# Patient Record
Sex: FEMALE | Race: WHITE | NOT HISPANIC OR LATINO | Employment: PART TIME | URBAN - METROPOLITAN AREA
[De-identification: names, ages, dates, MRNs, and addresses within clinical notes are randomized per-mention and may not be internally consistent; named-entity substitution may affect disease eponyms.]

---

## 2017-02-18 ENCOUNTER — APPOINTMENT (OUTPATIENT)
Dept: RADIOLOGY | Facility: MEDICAL CENTER | Age: 52
DRG: 069 | End: 2017-02-18
Attending: GENERAL ACUTE CARE HOSPITAL

## 2017-02-18 ENCOUNTER — HOSPITAL ENCOUNTER (INPATIENT)
Facility: MEDICAL CENTER | Age: 52
LOS: 2 days | DRG: 069 | End: 2017-02-20
Attending: GENERAL ACUTE CARE HOSPITAL | Admitting: INTERNAL MEDICINE

## 2017-02-18 ENCOUNTER — RESOLUTE PROFESSIONAL BILLING HOSPITAL PROF FEE (OUTPATIENT)
Dept: HOSPITALIST | Facility: MEDICAL CENTER | Age: 52
End: 2017-02-18

## 2017-02-18 DIAGNOSIS — R20.0 FACIAL NUMBNESS: ICD-10-CM

## 2017-02-18 DIAGNOSIS — R07.9 CHEST PAIN, UNSPECIFIED TYPE: ICD-10-CM

## 2017-02-18 DIAGNOSIS — R29.810 FACIAL DROOP: ICD-10-CM

## 2017-02-18 DIAGNOSIS — R20.0 RIGHT ARM NUMBNESS: ICD-10-CM

## 2017-02-18 DIAGNOSIS — R29.898 RIGHT ARM WEAKNESS: ICD-10-CM

## 2017-02-18 PROBLEM — R47.81 SLURRED SPEECH: Status: ACTIVE | Noted: 2017-02-18

## 2017-02-18 LAB
ALBUMIN SERPL BCP-MCNC: 4.1 G/DL (ref 3.2–4.9)
ALBUMIN/GLOB SERPL: 1.4 G/DL
ALP SERPL-CCNC: 95 U/L (ref 30–99)
ALT SERPL-CCNC: 11 U/L (ref 2–50)
ANION GAP SERPL CALC-SCNC: 9 MMOL/L (ref 0–11.9)
AST SERPL-CCNC: 16 U/L (ref 12–45)
BASOPHILS # BLD AUTO: 0.7 % (ref 0–1.8)
BASOPHILS # BLD: 0.06 K/UL (ref 0–0.12)
BILIRUB SERPL-MCNC: 0.3 MG/DL (ref 0.1–1.5)
BNP SERPL-MCNC: 3 PG/ML (ref 0–100)
BUN SERPL-MCNC: 14 MG/DL (ref 8–22)
CALCIUM SERPL-MCNC: 9.6 MG/DL (ref 8.5–10.5)
CHLORIDE SERPL-SCNC: 108 MMOL/L (ref 96–112)
CO2 SERPL-SCNC: 20 MMOL/L (ref 20–33)
CREAT SERPL-MCNC: 1.04 MG/DL (ref 0.5–1.4)
EKG IMPRESSION: NORMAL
EOSINOPHIL # BLD AUTO: 0.2 K/UL (ref 0–0.51)
EOSINOPHIL NFR BLD: 2.4 % (ref 0–6.9)
ERYTHROCYTE [DISTWIDTH] IN BLOOD BY AUTOMATED COUNT: 46.6 FL (ref 35.9–50)
GFR SERPL CREATININE-BSD FRML MDRD: 55 ML/MIN/1.73 M 2
GLOBULIN SER CALC-MCNC: 2.9 G/DL (ref 1.9–3.5)
GLUCOSE SERPL-MCNC: 93 MG/DL (ref 65–99)
HCT VFR BLD AUTO: 39.4 % (ref 37–47)
HGB BLD-MCNC: 13.5 G/DL (ref 12–16)
IMM GRANULOCYTES # BLD AUTO: 0.03 K/UL (ref 0–0.11)
IMM GRANULOCYTES NFR BLD AUTO: 0.4 % (ref 0–0.9)
LIPASE SERPL-CCNC: 35 U/L (ref 11–82)
LYMPHOCYTES # BLD AUTO: 2.25 K/UL (ref 1–4.8)
LYMPHOCYTES NFR BLD: 26.8 % (ref 22–41)
MCH RBC QN AUTO: 31.5 PG (ref 27–33)
MCHC RBC AUTO-ENTMCNC: 34.3 G/DL (ref 33.6–35)
MCV RBC AUTO: 91.8 FL (ref 81.4–97.8)
MONOCYTES # BLD AUTO: 0.78 K/UL (ref 0–0.85)
MONOCYTES NFR BLD AUTO: 9.3 % (ref 0–13.4)
NEUTROPHILS # BLD AUTO: 5.09 K/UL (ref 2–7.15)
NEUTROPHILS NFR BLD: 60.4 % (ref 44–72)
NRBC # BLD AUTO: 0 K/UL
NRBC BLD AUTO-RTO: 0 /100 WBC
PLATELET # BLD AUTO: 309 K/UL (ref 164–446)
PMV BLD AUTO: 9.8 FL (ref 9–12.9)
POTASSIUM SERPL-SCNC: 3.8 MMOL/L (ref 3.6–5.5)
PROT SERPL-MCNC: 7 G/DL (ref 6–8.2)
RBC # BLD AUTO: 4.29 M/UL (ref 4.2–5.4)
SODIUM SERPL-SCNC: 137 MMOL/L (ref 135–145)
TROPONIN I SERPL-MCNC: <0.01 NG/ML (ref 0–0.04)
WBC # BLD AUTO: 8.4 K/UL (ref 4.8–10.8)

## 2017-02-18 PROCEDURE — 85610 PROTHROMBIN TIME: CPT

## 2017-02-18 PROCEDURE — 99285 EMERGENCY DEPT VISIT HI MDM: CPT

## 2017-02-18 PROCEDURE — 99407 BEHAV CHNG SMOKING > 10 MIN: CPT | Performed by: INTERNAL MEDICINE

## 2017-02-18 PROCEDURE — 700117 HCHG RX CONTRAST REV CODE 255: Performed by: GENERAL ACUTE CARE HOSPITAL

## 2017-02-18 PROCEDURE — 36415 COLL VENOUS BLD VENIPUNCTURE: CPT

## 2017-02-18 PROCEDURE — 80053 COMPREHEN METABOLIC PANEL: CPT

## 2017-02-18 PROCEDURE — 85025 COMPLETE CBC W/AUTO DIFF WBC: CPT

## 2017-02-18 PROCEDURE — 85730 THROMBOPLASTIN TIME PARTIAL: CPT

## 2017-02-18 PROCEDURE — 93005 ELECTROCARDIOGRAM TRACING: CPT

## 2017-02-18 PROCEDURE — 84484 ASSAY OF TROPONIN QUANT: CPT

## 2017-02-18 PROCEDURE — 70450 CT HEAD/BRAIN W/O DYE: CPT

## 2017-02-18 PROCEDURE — 99223 1ST HOSP IP/OBS HIGH 75: CPT | Mod: 25 | Performed by: INTERNAL MEDICINE

## 2017-02-18 PROCEDURE — 700105 HCHG RX REV CODE 258: Performed by: GENERAL ACUTE CARE HOSPITAL

## 2017-02-18 PROCEDURE — 83880 ASSAY OF NATRIURETIC PEPTIDE: CPT

## 2017-02-18 PROCEDURE — 83690 ASSAY OF LIPASE: CPT

## 2017-02-18 PROCEDURE — 770020 HCHG ROOM/CARE - TELE (206)

## 2017-02-18 PROCEDURE — 70498 CT ANGIOGRAPHY NECK: CPT

## 2017-02-18 PROCEDURE — 74175 CTA ABDOMEN W/CONTRAST: CPT

## 2017-02-18 PROCEDURE — 70496 CT ANGIOGRAPHY HEAD: CPT

## 2017-02-18 RX ORDER — SODIUM CHLORIDE 9 MG/ML
1000 INJECTION, SOLUTION INTRAVENOUS ONCE
Status: COMPLETED | OUTPATIENT
Start: 2017-02-18 | End: 2017-02-18

## 2017-02-18 RX ADMIN — SODIUM CHLORIDE 1000 ML: 9 INJECTION, SOLUTION INTRAVENOUS at 22:21

## 2017-02-18 RX ADMIN — IOHEXOL 150 ML: 350 INJECTION, SOLUTION INTRAVENOUS at 23:47

## 2017-02-18 ASSESSMENT — PAIN SCALES - GENERAL: PAINLEVEL_OUTOF10: 5

## 2017-02-19 ENCOUNTER — APPOINTMENT (OUTPATIENT)
Dept: RADIOLOGY | Facility: MEDICAL CENTER | Age: 52
DRG: 069 | End: 2017-02-19
Attending: INTERNAL MEDICINE

## 2017-02-19 LAB
ANION GAP SERPL CALC-SCNC: 7 MMOL/L (ref 0–11.9)
APTT PPP: 32.6 SEC (ref 24.7–36)
APTT PPP: 32.8 SEC (ref 24.7–36)
BUN SERPL-MCNC: 12 MG/DL (ref 8–22)
CALCIUM SERPL-MCNC: 8.8 MG/DL (ref 8.5–10.5)
CHLORIDE SERPL-SCNC: 110 MMOL/L (ref 96–112)
CHOLEST SERPL-MCNC: 190 MG/DL (ref 100–199)
CO2 SERPL-SCNC: 22 MMOL/L (ref 20–33)
CREAT SERPL-MCNC: 0.84 MG/DL (ref 0.5–1.4)
EKG IMPRESSION: NORMAL
ERYTHROCYTE [DISTWIDTH] IN BLOOD BY AUTOMATED COUNT: 48.1 FL (ref 35.9–50)
EST. AVERAGE GLUCOSE BLD GHB EST-MCNC: 120 MG/DL
GFR SERPL CREATININE-BSD FRML MDRD: >60 ML/MIN/1.73 M 2
GLUCOSE SERPL-MCNC: 90 MG/DL (ref 65–99)
HBA1C MFR BLD: 5.8 % (ref 0–5.6)
HCT VFR BLD AUTO: 37 % (ref 37–47)
HDLC SERPL-MCNC: 30 MG/DL
HGB BLD-MCNC: 12.8 G/DL (ref 12–16)
INR PPP: 0.97 (ref 0.87–1.13)
INR PPP: 1.06 (ref 0.87–1.13)
LDLC SERPL CALC-MCNC: 133 MG/DL
MCH RBC QN AUTO: 31.9 PG (ref 27–33)
MCHC RBC AUTO-ENTMCNC: 34.6 G/DL (ref 33.6–35)
MCV RBC AUTO: 92.3 FL (ref 81.4–97.8)
PLATELET # BLD AUTO: 269 K/UL (ref 164–446)
PMV BLD AUTO: 9.5 FL (ref 9–12.9)
POTASSIUM SERPL-SCNC: 4 MMOL/L (ref 3.6–5.5)
PROTHROMBIN TIME: 13.2 SEC (ref 12–14.6)
PROTHROMBIN TIME: 14.1 SEC (ref 12–14.6)
RBC # BLD AUTO: 4.01 M/UL (ref 4.2–5.4)
SODIUM SERPL-SCNC: 139 MMOL/L (ref 135–145)
TRIGL SERPL-MCNC: 134 MG/DL (ref 0–149)
TROPONIN I SERPL-MCNC: <0.01 NG/ML (ref 0–0.04)
TROPONIN I SERPL-MCNC: <0.01 NG/ML (ref 0–0.04)
WBC # BLD AUTO: 6.4 K/UL (ref 4.8–10.8)

## 2017-02-19 PROCEDURE — 84484 ASSAY OF TROPONIN QUANT: CPT | Mod: 91

## 2017-02-19 PROCEDURE — 36415 COLL VENOUS BLD VENIPUNCTURE: CPT

## 2017-02-19 PROCEDURE — 93005 ELECTROCARDIOGRAM TRACING: CPT | Performed by: INTERNAL MEDICINE

## 2017-02-19 PROCEDURE — A9270 NON-COVERED ITEM OR SERVICE: HCPCS | Performed by: INTERNAL MEDICINE

## 2017-02-19 PROCEDURE — 80048 BASIC METABOLIC PNL TOTAL CA: CPT

## 2017-02-19 PROCEDURE — 770020 HCHG ROOM/CARE - TELE (206)

## 2017-02-19 PROCEDURE — 700111 HCHG RX REV CODE 636 W/ 250 OVERRIDE (IP): Performed by: INTERNAL MEDICINE

## 2017-02-19 PROCEDURE — 99232 SBSQ HOSP IP/OBS MODERATE 35: CPT | Performed by: INTERNAL MEDICINE

## 2017-02-19 PROCEDURE — 83036 HEMOGLOBIN GLYCOSYLATED A1C: CPT

## 2017-02-19 PROCEDURE — 700102 HCHG RX REV CODE 250 W/ 637 OVERRIDE(OP): Performed by: INTERNAL MEDICINE

## 2017-02-19 PROCEDURE — 80061 LIPID PANEL: CPT

## 2017-02-19 PROCEDURE — A9577 INJ MULTIHANCE: HCPCS | Performed by: INTERNAL MEDICINE

## 2017-02-19 PROCEDURE — 85610 PROTHROMBIN TIME: CPT

## 2017-02-19 PROCEDURE — 700117 HCHG RX CONTRAST REV CODE 255: Performed by: INTERNAL MEDICINE

## 2017-02-19 PROCEDURE — 85027 COMPLETE CBC AUTOMATED: CPT

## 2017-02-19 PROCEDURE — 70553 MRI BRAIN STEM W/O & W/DYE: CPT

## 2017-02-19 PROCEDURE — 93010 ELECTROCARDIOGRAM REPORT: CPT | Performed by: INTERNAL MEDICINE

## 2017-02-19 PROCEDURE — 85730 THROMBOPLASTIN TIME PARTIAL: CPT

## 2017-02-19 RX ORDER — CLOPIDOGREL BISULFATE 75 MG/1
75 TABLET ORAL DAILY
Status: DISCONTINUED | OUTPATIENT
Start: 2017-02-19 | End: 2017-02-20 | Stop reason: HOSPADM

## 2017-02-19 RX ORDER — ACETAMINOPHEN 325 MG/1
650 TABLET ORAL EVERY 6 HOURS PRN
Status: DISCONTINUED | OUTPATIENT
Start: 2017-02-19 | End: 2017-02-20 | Stop reason: HOSPADM

## 2017-02-19 RX ORDER — NICOTINE 21 MG/24HR
21 PATCH, TRANSDERMAL 24 HOURS TRANSDERMAL
Status: DISCONTINUED | OUTPATIENT
Start: 2017-02-19 | End: 2017-02-20 | Stop reason: HOSPADM

## 2017-02-19 RX ORDER — ASPIRIN 81 MG/1
81 TABLET, CHEWABLE ORAL DAILY
Status: DISCONTINUED | OUTPATIENT
Start: 2017-02-19 | End: 2017-02-20 | Stop reason: HOSPADM

## 2017-02-19 RX ORDER — ENEMA 19; 7 G/133ML; G/133ML
1 ENEMA RECTAL
Status: DISCONTINUED | OUTPATIENT
Start: 2017-02-19 | End: 2017-02-20 | Stop reason: HOSPADM

## 2017-02-19 RX ORDER — ONDANSETRON 2 MG/ML
4 INJECTION INTRAMUSCULAR; INTRAVENOUS EVERY 4 HOURS PRN
Status: DISCONTINUED | OUTPATIENT
Start: 2017-02-19 | End: 2017-02-20 | Stop reason: HOSPADM

## 2017-02-19 RX ORDER — LACTULOSE 20 G/30ML
30 SOLUTION ORAL
Status: DISCONTINUED | OUTPATIENT
Start: 2017-02-19 | End: 2017-02-20 | Stop reason: HOSPADM

## 2017-02-19 RX ORDER — ATORVASTATIN CALCIUM 80 MG/1
80 TABLET, FILM COATED ORAL EVERY EVENING
Status: DISCONTINUED | OUTPATIENT
Start: 2017-02-19 | End: 2017-02-20 | Stop reason: HOSPADM

## 2017-02-19 RX ORDER — PROMETHAZINE HYDROCHLORIDE 25 MG/1
12.5-25 SUPPOSITORY RECTAL EVERY 4 HOURS PRN
Status: DISCONTINUED | OUTPATIENT
Start: 2017-02-19 | End: 2017-02-20 | Stop reason: HOSPADM

## 2017-02-19 RX ORDER — MORPHINE SULFATE 4 MG/ML
2-4 INJECTION, SOLUTION INTRAMUSCULAR; INTRAVENOUS
Status: DISCONTINUED | OUTPATIENT
Start: 2017-02-19 | End: 2017-02-20 | Stop reason: HOSPADM

## 2017-02-19 RX ORDER — ASPIRIN 325 MG
325 TABLET ORAL EVERY EVENING
COMMUNITY

## 2017-02-19 RX ORDER — AMOXICILLIN 250 MG
1 CAPSULE ORAL NIGHTLY
Status: DISCONTINUED | OUTPATIENT
Start: 2017-02-19 | End: 2017-02-20 | Stop reason: HOSPADM

## 2017-02-19 RX ORDER — PROMETHAZINE HYDROCHLORIDE 25 MG/1
12.5-25 TABLET ORAL EVERY 4 HOURS PRN
Status: DISCONTINUED | OUTPATIENT
Start: 2017-02-19 | End: 2017-02-20 | Stop reason: HOSPADM

## 2017-02-19 RX ORDER — DOCUSATE SODIUM 100 MG/1
100 CAPSULE, LIQUID FILLED ORAL 2 TIMES DAILY
Status: DISCONTINUED | OUTPATIENT
Start: 2017-02-19 | End: 2017-02-20 | Stop reason: HOSPADM

## 2017-02-19 RX ORDER — ONDANSETRON 4 MG/1
4 TABLET, ORALLY DISINTEGRATING ORAL EVERY 4 HOURS PRN
Status: DISCONTINUED | OUTPATIENT
Start: 2017-02-19 | End: 2017-02-20 | Stop reason: HOSPADM

## 2017-02-19 RX ORDER — AMOXICILLIN 250 MG
1 CAPSULE ORAL
Status: DISCONTINUED | OUTPATIENT
Start: 2017-02-19 | End: 2017-02-20 | Stop reason: HOSPADM

## 2017-02-19 RX ORDER — LISINOPRIL 20 MG/1
20 TABLET ORAL
Status: DISCONTINUED | OUTPATIENT
Start: 2017-02-19 | End: 2017-02-20 | Stop reason: HOSPADM

## 2017-02-19 RX ORDER — NITROGLYCERIN 0.4 MG/1
0.4 TABLET SUBLINGUAL
Status: DISCONTINUED | OUTPATIENT
Start: 2017-02-19 | End: 2017-02-20 | Stop reason: HOSPADM

## 2017-02-19 RX ORDER — BISACODYL 10 MG
10 SUPPOSITORY, RECTAL RECTAL
Status: DISCONTINUED | OUTPATIENT
Start: 2017-02-19 | End: 2017-02-20 | Stop reason: HOSPADM

## 2017-02-19 RX ADMIN — LISINOPRIL 20 MG: 20 TABLET ORAL at 17:46

## 2017-02-19 RX ADMIN — CLOPIDOGREL 75 MG: 75 TABLET, FILM COATED ORAL at 08:53

## 2017-02-19 RX ADMIN — ACETAMINOPHEN 650 MG: 325 TABLET, FILM COATED ORAL at 22:16

## 2017-02-19 RX ADMIN — GADOBENATE DIMEGLUMINE 9 ML: 529 INJECTION, SOLUTION INTRAVENOUS at 14:33

## 2017-02-19 RX ADMIN — ENOXAPARIN SODIUM 40 MG: 100 INJECTION SUBCUTANEOUS at 08:54

## 2017-02-19 RX ADMIN — ASPIRIN 81 MG: 81 TABLET, CHEWABLE ORAL at 08:53

## 2017-02-19 ASSESSMENT — PAIN SCALES - GENERAL
PAINLEVEL_OUTOF10: 0
PAINLEVEL_OUTOF10: 4

## 2017-02-19 ASSESSMENT — PATIENT HEALTH QUESTIONNAIRE - PHQ9
2. FEELING DOWN, DEPRESSED, IRRITABLE, OR HOPELESS: NOT AT ALL
SUM OF ALL RESPONSES TO PHQ9 QUESTIONS 1 AND 2: 0
1. LITTLE INTEREST OR PLEASURE IN DOING THINGS: NOT AT ALL
SUM OF ALL RESPONSES TO PHQ QUESTIONS 1-9: 0

## 2017-02-19 ASSESSMENT — LIFESTYLE VARIABLES
EVER_SMOKED: YES
ALCOHOL_USE: NO

## 2017-02-19 ASSESSMENT — COPD QUESTIONNAIRES
COPD SCREENING SCORE: 4
HAVE YOU SMOKED AT LEAST 100 CIGARETTES IN YOUR ENTIRE LIFE: YES
DURING THE PAST 4 WEEKS HOW MUCH DID YOU FEEL SHORT OF BREATH: SOME OF THE TIME
DO YOU EVER COUGH UP ANY MUCUS OR PHLEGM?: NO/ONLY WITH OCCASIONAL COLDS OR INFECTIONS

## 2017-02-19 NOTE — ED PROVIDER NOTES
ED Provider Note    Scribed for Tony Moncada M.D. by Madison Muñiz. 2/18/2017, 9:42 PM.    Primary care provider: MARIA M Prather  Means of arrival: Walk-In  History obtained from: Patient  History limited by: None    CHIEF COMPLAINT  Chief Complaint   Patient presents with   • Numbness   • Unilateral Weakness     no resolved - started at 1730 and resolved within 10 minutes   • Chest Pain       HPI  Mónicaobinna Aguilera is a 51 y.o. female who presents to the Emergency Department complaining of chest pain onset approximately 11 hours ago. Per patient it is a tight chest pain that has been progressively worsening. She explains that the pain radiates to her left side and left arm. Patient states she went to work this afternoon at 530pm and noticed her right arm went numb. She explains that for a few minutes she was unable to talk or walk. She also reports associated speech and vision changes lasting for 10-20 minutes. Patient denies headaches. Currently has no headache or vision changes or numbness or weakness in her upper or lower extremities or face but still has mild left-sided chest pain that radiates to her left arm.    REVIEW OF SYSTEMS  See HPI for further details. All other systems are negative.     PAST MEDICAL HISTORY   has a past medical history of Cancer (CMS-HCC) and Hypertension.    SURGICAL HISTORY   has past surgical history that includes other neurological surg (June 2013).    SOCIAL HISTORY  Social History   Substance Use Topics   • Smoking status: Current Every Day Smoker -- 0.50 packs/day     Types: Cigarettes   • Smokeless tobacco: Never Used      Comment: 1 ppd   • Alcohol Use: No      History   Drug Use No       FAMILY HISTORY  History reviewed. No pertinent family history.    CURRENT MEDICATIONS  Reviewed. See Encounter Summary.     ALLERGIES  Allergies   Allergen Reactions   • Augmentin    • Codeine    • Eggs Nausea   • Lactose Nausea   • Other Food Nausea     Mayonnaise    • Pcn  "[Penicillins]        PHYSICAL EXAM  VITAL SIGNS: /69 mmHg  Pulse 87  Temp(Src) 36.8 °C (98.2 °F)  Resp 16  Ht 1.727 m (5' 8\")  Wt 84.5 kg (186 lb 4.6 oz)  BMI 28.33 kg/m2  SpO2 97%  LMP 02/21/2011   Pulse ox interpretation: I interpret this pulse ox as normal.  Constitutional: Alert in mild painful distress.  HENT: No signs of trauma, Bilateral external ears normal, Nose normal. MMM mild facial asymmetry  Eyes: Pupils are equal and reactive, Conjunctiva normal, Non-icteric.   Neck: Normal range of motion, No tenderness, Supple, No stridor. No JVD  Lymphatic: No lymphadenopathy noted.   Cardiovascular: Regular rate and rhythm, no murmurs, 2+ equal pulses radial and DP.   Thorax & Lungs: Normal breath sounds, No respiratory distress, No wheezing, No chest tenderness.   Abdomen: Bowel sounds normal, Soft, No tenderness, No tenderness at McBurney's point, No masses, No pulsatile masses. No peritoneal signs.  Skin: Warm, Dry, No erythema, No rash.   Back: No bony tenderness, No CVA tenderness.   Extremities: Intact distal pulses, No edema, No tenderness, No cyanosis,  No warmth or asymmetry  Musculoskeletal: Good range of motion in all major joints. No tenderness to palpation or major deformities noted.   Neurologic: Alert and oriented ×4 , Face symmetric, mild loss of left-sided nasolabial fold, Sensation equal and intact V1-V3, No pronator drift, Finger to nose and rapid alternating movements intact.  strength, bicep and triceps 5/5 bilaterally. Thumbs up sign, Ok sign, and finger abduction intact bilaterally.  Knee flexion, extension, dorsiflexion, and plantarflexion 5/5 bilaterally  Psychiatric: Affect normal, Judgment normal, Mood normal. , Mild slurred speech            DIAGNOSTIC STUDIES / PROCEDURES     LABS  Labs Reviewed   ESTIMATED GFR - Abnormal; Notable for the following:     GFR If Non  55 (*)     All other components within normal limits    Narrative:     Indicate which " anticoagulants the patient is on:->UNKNOWN   CBC WITHOUT DIFFERENTIAL - Abnormal; Notable for the following:     RBC 4.01 (*)     All other components within normal limits   TROPONIN    Narrative:     Indicate which anticoagulants the patient is on:->UNKNOWN   BTYPE NATRIURETIC PEPTIDE    Narrative:     Indicate which anticoagulants the patient is on:->UNKNOWN   CBC WITH DIFFERENTIAL    Narrative:     Indicate which anticoagulants the patient is on:->UNKNOWN   COMP METABOLIC PANEL    Narrative:     Indicate which anticoagulants the patient is on:->UNKNOWN   LIPASE    Narrative:     Indicate which anticoagulants the patient is on:->UNKNOWN   APTT    Narrative:     Indicate which anticoagulants the patient is on:->UNKNOWN   BASIC METABOLIC PANEL   HEMOGLOBIN A1C   PROTHROMBIN TIME    Narrative:     Indicate which anticoagulants the patient is on:->UNKNOWN   LIPID PROFILE   TROPONIN     All labs were reviewed by me.    EKG  12 Lead EKG interpreted by me to show: 7:51PM  Sinus tachycardia with a rate of 102  Normal intervals  No ST elevation or depression  Nonspecific T wave abnormality      RADIOLOGY  CT-CTA COMPLETE THORACOABDOMINAL AORTA   Final Result      1.  No evidence of acute traumatic aortic injury   2.  Atherosclerosis with estimated 50-75% stenosis of the infrarenal abdominal aorta   3.  Estimated 25-50% stenosis of the LEFT renal artery   4.  RIGHT renal atrophy with diffuse arterial narrowing   5.  Estimated 75% or greater stenosis of the celiac artery, suspect median arcuate ligament compression   6.  Subcentimeter hypodense RIGHT hepatic lesion is likely a cyst or hemangioma absent a history of cancer   7.  Probable subcentimeter LEFT renal cyst   8.  Emphysema   9.  Mildly enlarged mediastinal lymph nodes         CT-CTA NECK WITH & W/O-POST PROCESSING   Final Result      1.  CT angiogram of the neck within normal limits.   2.  BILATERAL subcentimeter partially calcified thyroid nodules      CT-CTA HEAD  WITH & W/O-POST PROCESS   Final Result      CT angiogram of the [place] within normal limits.      CT-HEAD W/O   Final Result      1.  No evidence of intracranial hemorrhage or large territorial infarction   2.  New RIGHT frontal area of white matter hypodensity and coarse calcification could be an evolving area of encephalomalacia or could be a neoplasm with calcifications as a ganglioglioma or astrocytoma or a vascular lesion such as a cavernous    malformation with interval hemorrhage. Recommend MRI brain without and with contrast further assess when clinically appropriate.      MR-BRAIN-WITH    (Results Pending)   NM-CARDIAC STRESS TEST    (Results Pending)     The radiologist's interpretation of all radiological studies have been reviewed by me.    COURSE & MEDICAL DECISION MAKING  Nursing notes, VS, PMSFHx reviewed in chart. Pertinent Labs & Imaging studies reviewed. (See chart for details)    9:42 PM Patient seen and examined at bedside. Ordered for estimated GFR, troponin, BNP, CBC with differential, CBC with differential, CMP, prothrombin time, APTT, lipase, EKG to evaluate.     10:28pm Spoke w NEuro Dr. Bustamante who recommends continuing inpatient follow up but because her neuro symptoms of right arm numbness and weakness and left facial numbness and tongue symptoms are mild and improving and she is now out of the window that no TPA is indicated at this time.    Because of her chest pain that radiates to her left arm with stroke like symptoms she is awaiting CTangio of aorta and head and neck. Plan to admit to hospitalist, already received 162 of asa at work PTA, will await further AC until Angio results.    Decision Making:  This is a 51 y.o. year old female who presents with chest pain radiating to her left arm and stroke like symptoms with left face numbness and right arm numbness and weakness. Patient is out of the window for TPA and is pending CT angios and plan for admitting to medicine  service.    Patient is admitted to the hospitalist service in good condition, aspirin was given prior to arrival.    FINAL IMPRESSION  1. Chest pain, unspecified type    2. Facial numbness    3. Facial droop    4. Right arm numbness    5. Right arm weakness          Madison FRY (Scribe), am scribing for, and in the presence of, Tony Moncada M.D..    Electronically signed by: Madison Muñiz (Scribe), 2/18/2017    ITony M.D. personally performed the services described in this documentation, as scribed by Madison Muñiz in my presence, and it is both accurate and complete.    The note accurately reflects work and decisions made by me.  Tony Moncada  2/19/2017  3:32 AM

## 2017-02-19 NOTE — ED NOTES
Report received from Naomi ELIZALDE. Pt resting quietly on right side. Pt is oriented x4, drowsy. STEPHENSON. Denies any weakness at this time. Denies headache. VSS. Pt comfort level checked, denies needs. Awaiting admitting physician. Call light in reach.

## 2017-02-19 NOTE — ED NOTES
PIV placed as charted, pt medicated per MAR, informed of pending CT, verbalized understanding. No distress noted, denies needs at this time.

## 2017-02-19 NOTE — PROGRESS NOTES
Pt refusing telemetry monitoring.  Pulled box off.  md aware.  Pt is agreeable to go to mri after consulting further with md.   Will wait for now.   Appears emotionally labile.    at bedside appears intoxicated.

## 2017-02-19 NOTE — CARE PLAN
Problem: Communication  Goal: The ability to communicate needs accurately and effectively will improve  Outcome: PROGRESSING AS EXPECTED  Intervention: Educate patient and significant other/support system about the plan of care, procedures, treatments, medications and allow for questions  Patient has been educated and understands the plan of care, scheduled procedures, treatments, and medications up to this point in her care.      Problem: Safety  Goal: Will remain free from falls  Outcome: PROGRESSING AS EXPECTED  Intervention: Implement fall precautions  Fall precautions in place, patient educated and understands the need for fall precautions.  Treaded slipper socks on patient, bed alarm in use, call light within reach, no history of falls.

## 2017-02-19 NOTE — PROGRESS NOTES
Patient arrived from ED via gurney with transport.  Patient on room air, IV saline locked.  Report received from Naomi/TONIO.  No reports of pain, numbness, or tingling at this time.  Will continue to monitor with hourly rounding in place.  Went over POC with patient, safety precautions are in place.

## 2017-02-19 NOTE — H&P
PRIMARY CARE PHYSICIAN:  PHILLY Bone    CHIEF COMPLAINT:  Left-sided chest pain, right upper extremity weakness and   left facial numbness, slurred speech.    HISTORY OF PRESENT ILLNESS:  This is a 51-year-old female with a past medical   history significant for hypertension who presents today for evaluation of   left-sided chest pain that began around 11:00 a.m. this morning.  She states   the pain is intermittent and it is sharp pain that comes and goes, lasted   until 8:00 p.m. today.  The patient states that she took 2 aspirin, but does   not know the dosage of her aspirin at home before arriving to the ER.  She   denies any shortness of breath, fever, chills, diaphoresis, nausea, vomiting,   headache associated with it.  She denies any palpitations, lower extremity   edema associated with it.  She then states that at 5:30 p.m. this evening, she   noticed that she was having right arm weakness as well as left facial   numbness and slurred speech.  She now tells me that her slurred speech and   right upper extremity weakness have resolved.  She is presently chest pain   free.    REVIEW OF SYSTEMS:  A comprehensive review of systems was conducted and all   pertinent positive and negative are documented in the HPI.    PAST MEDICAL HISTORY:  Significant for  hypertension.    PAST SURGICAL HISTORY:  None.    SOCIAL HISTORY:  Significant for patient smokes half a pack a day for the past   40 years.  Denies any alcohol or illicit drug use.    FAMILY HISTORY:  Significant for mother who had brain cancer, father who had   liver cancer and grandmother who had colon cancer.    ALLERGIES:  PATIENT IS ALLERGIC TO AUGMENTIN, CODEINE, EGGS, LACTOSE,   PENICILLIN.    HOME MEDICATIONS:  Aspirin 325 mg daily, aspirin 81 mg daily, lisinopril 20 mg   daily.    PHYSICAL EXAMINATION:  VITAL SIGNS:  Temperature 36.7, heart rate is 76, respirations of 14, blood   pressure 125/66, O2 saturation 95% on room air.  GENERAL:  This  is a 51-year-old pleasant, cooperative female in no acute   distress.  HEENT:  Normocephalic, atraumatic.  Pupils equal and reactive to light.    Extraocular motions intact.  Mild left facial droop noted.  NECK:  Supple.  No lymphadenopathy noted.  CARDIOVASCULAR:  Regular rate and rhythm.  No murmurs, rubs or gallops noted.  LUNGS:  Clear to auscultation bilaterally.  No rhonchi, wheezes, or rales   heard.  ABDOMEN:  Soft, nontender, and nondistended.  Bowel sounds present.  EXTREMITIES:  No clubbing, cyanosis or edema noted.  NEUROLOGIC:  Alert, awake, oriented x4.  Mild left facial droop noted.    Sensation intact.  No pronator drift noted.  Finger-to-nose is within normal   limits, 5/5 motor strength in bilateral upper and lower extremities.  PSYCHIATRIC:  Normal mood, normal affect, normal judgment.    LABORATORY DATA:  WBC of 8.4, hemoglobin of 13.5, hematocrit 39.4.  Lipase of   35, platelets of 309.  Sodium 137.  Troponin less than 0.01.  BNP of 3.    IMAGING STUDIES:  CTA thoracoabdominal shows no evidence of acute trauma.    Arthrosclerosis with estimated 50-75% stenosis of the infrarenal abdominal   aorta, 25-50% stenosis of the left renal artery, right renal atrophy with   diffuse arterial narrowing noted, 75% or greater stenosis of cephalic artery,   suspect median arcuate ligament compression.  CTA neck within normal limits,   bilateral subcentimeter partially calcified thyroid nodules noted.  CTA head   within normal limits.  CT head shows new right frontal area of white matter   that high that is hypodense and coarse calcification noted, this could be   evolving encephalomalacia versus neoplasm.  EKG per my read, sinus   tachycardia, rate of 102, QTC of 449, no acute ST or T-wave abnormalities   noted.    ASSESSMENT AND PLAN:  1.  For patient's chest pain, she will be admitted and monitored closely on   telemetry.  We will continue to trend patient's troponins and EKG.  Patient is   on aspirin 81 mg  daily, Lipitor 80 mg daily.  Lipid panel is ordered.    Patient's echo in January 2016 shows EF of 40%, global hypokinesis, grade II   diastolic dysfunction.  Repeat echo is ordered.  The patient's nuclear stress   test in January 2016 showed prior anterior apical and inferior infarct, no   reversible ischemia noted.  A repeat nuclear stress test ordered for further   evaluation.  2.  For patient's new right frontal area lesion, MRI brain is ordered for   further evaluation.  3.  For the patient's right upper extremity weakness and left-sided facial   droop, patient will be admitted, q. 4 hour neuro checks are ordered.  As   patient was previously on aspirin 325 mg, was started on Plavix 75 mg daily.    Repeat echo was ordered.  CTA head and neck did not show any acute findings.  Case was discussed by ERP with neurology and patient was not a tpa candidate.  4. 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  5.  Tobacco abuse.  Smoking cessation counseling was provided for 10 minutes.    Patient counseled on outpatient resources as well as her medication options   including Wellbutrin, Chantix.  Patient states that she is willing to quit.    She started on nicotine replacement therapy.  6.  Hypertension.  I will hold the patient's lisinopril at present time and   allow for permissive hypertension.  7.  Systolic and diastolic combined heart failure.  The patient is not in   acute exacerbation.  I am currently allowing permissive hypertension due to patient's   TIA.  The patient will need to be started on lisinopril and beta-blockers   prior to discharge.  The patient will need heart failure education prior to   discharge.     DISPOSITION:  Inpatient, patient will likely require greater than 2 midnights   of care.    PROPHYLAXIS:  Patient started on Lovenox subQ for DVT prophylaxis.  No GI   prophylaxis indicated.  Bowel protocol initiated.      CODE STATUS:  Full.       ____________________________________     FRANSICO WILHELM MD MS / [facility]BIN[place]    DD:  02/19/2017 05:21:57  DT:  02/19/2017 06:03:16    D#:  [id number]  Job#:  [id number]

## 2017-02-19 NOTE — PROGRESS NOTES
Report received, pt care assumed, tele box on. VSS, pt assessment complete. Pt aaox4, no signs of distress noted at this time. POC discussed with pt and verbalizes no questions. Pt c/o of 0 pain in 0, PRN pain med 0 administered. Pt denies any additional needs at this time. Bed in lowest position, bed alarm off, pt educated on fall risk and verbalized understanding, call light within reach, will continue to monitor.     Pt refusing stress test at this time.  nuc med, cardiology np, and hospitalist aware.   Stroke scale completed, 0

## 2017-02-19 NOTE — CONSULTS
"Cardiology Consult Note:    Evelyne Verdugo  Date & Time note created:    2/19/2017   11:43 AM     Referring MD:  Dr. Zavala    Patient ID:   Name:             Mónica Aguilera     YOB: 1965  Age:                 51 y.o.  female   MRN:               [id number]                                                             Chief Complaint:      Chest pressure yesterday    History of Present Illness:    52 y/o female moving back from [place] was working hard unpacking;C/o Rt facial weakness and Left facial strange feelings Yesterday at 5 pm long with moderate left chest tightness; Currently brief Chest tightness was gone and she attributed to \"over did it\" Last year she had similar complaint with cardiac evaluation:    1/6/2016 Myocardial Perfusion Report   IMPRESSIONS   Prior anteroapical and inferior infarct; No ischemia   29% left ventricular ejection fraction   1/8/2016, Echo: Hypertension.  Left ventricular ejection fraction is visually estimated to be 40%.    Global hypokinesis.  Grade II diastolic dysfunction.  Unable to estimate pulmonary artery pressure due to an inadequate   tricuspid regurgitant jet.  Mild mitral regurgitation.  No prior study is available for comparison.     Currently she wants to go home and refused stress test  Question if LHC; Trop neg x 3; EKG has no acute abn    CT head:   1.  No evidence of intracranial hemorrhage or large territorial infarction  2.  New RIGHT frontal area of white matter hypodensity and coarse calcification could be an evolving area of encephalomalacia or could be a neoplasm with calcifications as a ganglioglioma or astrocytoma or a vascular lesion such as a cavernous   malformation with interval hemorrhage. Recommend MRI brain without and with contrast further assess when clinically appropriate.           Review of Systems:    Facial sx's  Constitutional: Denies fevers, Denies weight changes  Eyes: Denies changes in vision, no eye pain  Ears/Nose/Throat/Mouth: " Denies nasal congestion or sore throat   Cardiovascular: - chest pain, - palpitations   Respiratory: - shortness of breath , Denies cough  Gastrointestinal/Hepatic: Denies abdominal pain, nausea, vomiting, diarrhea, constipation or GI bleeding   Genitourinary: Denies dysuria or frequency  Musculoskeletal/Rheum: Denies  joint pain and swelling   Skin: Denies rash  Neurological: Denies headache, confusion, memory loss or focal weakness/parasthesias  Psychiatric: denies mood disorder   Endocrine: Mónica thyroid problems  Heme/Oncology/Lymph Nodes: Denies enlarged lymph nodes, denies brusing or known bleeding disorder  All other systems were reviewed and are negative (AMA/CMS criteria)                Past Medical History:   Past Medical History   Diagnosis Date   • Cancer (CMS-HCC)      liver, cervical   • Hypertension      Active Hospital Problems    Diagnosis   • Chest pain [R07.9]     Priority: High   • Slurred speech [R47.81]   • Left facial numbness [R20.0]       Past Surgical History:  Past Surgical History   Procedure Laterality Date   • Other neurological surg  June 2013     [facility]       Hospital Medications:    Current facility-administered medications:   •  aspirin (ASA) chewable tab 81 mg, 81 mg, Oral, DAILY, Ana Moore M.D., 81 mg at 02/19/17 0853  •  docusate sodium (COLACE) capsule 100 mg, 100 mg, Oral, BID, Ana Moore M.D., 100 mg at 02/19/17 0130  •  senna-docusate (PERICOLACE or SENOKOT S) 8.6-50 MG per tablet 1 Tab, 1 Tab, Oral, Nightly, Ana Moore M.D., 1 Tab at 02/19/17 0130  •  senna-docusate (PERICOLACE or SENOKOT S) 8.6-50 MG per tablet 1 Tab, 1 Tab, Oral, Q24HRS PRN, Ana Moore M.D.  •  lactulose 20 GM/30ML solution 30 mL, 30 mL, Oral, Q24HRS PRN, Ana Moore M.D.  •  bisacodyl (DULCOLAX) suppository 10 mg, 10 mg, Rectal, Q24HRS PRN, Ana Moore M.D.  •  fleet enema 133 mL, 1 Each, Rectal, Once PRN, Ana Moore M.D.  •  enoxaparin (LOVENOX) inj 40 mg, 40 mg, Subcutaneous, DAILY, Ana Moore,  M.D., 40 mg at 02/19/17 0854  •  acetaminophen (TYLENOL) tablet 650 mg, 650 mg, Oral, Q6HRS PRN, Ana Moore M.D.  •  nitroglycerin (NITROSTAT) tablet 0.4 mg, 0.4 mg, Sublingual, Q5 MIN PRN, Ana Moore M.D.  •  morphine (pf) 4 mg/ml injection 2-4 mg, 2-4 mg, Intravenous, Q5 MIN PRN, Ana Moore M.D.  •  atorvastatin (LIPITOR) tablet 80 mg, 80 mg, Oral, Q EVENING, Ana Moore M.D., 80 mg at 02/19/17 0154  •  clopidogrel (PLAVIX) tablet 75 mg, 75 mg, Oral, DAILY, Ana Moore M.D., 75 mg at 02/19/17 0853  •  ondansetron (ZOFRAN) syringe/vial injection 4 mg, 4 mg, Intravenous, Q4HRS PRN, Ana Moore M.D.  •  ondansetron (ZOFRAN ODT) dispertab 4 mg, 4 mg, Oral, Q4HRS PRN, Ana Moore M.D.  •  promethazine (PHENERGAN) tablet 12.5-25 mg, 12.5-25 mg, Oral, Q4HRS PRN, Ana Moore M.D.  •  promethazine (PHENERGAN) suppository 12.5-25 mg, 12.5-25 mg, Rectal, Q4HRS PRN, Ana Moore M.D.  •  prochlorperazine (COMPAZINE) injection 5-10 mg, 5-10 mg, Intravenous, Q4HRS PRN, Ana Moore M.D.  •  INITIATE NICOTINE REPLACEMENT PROTOCOL , , , Once **AND** nicotine (NICODERM) 21 MG/24HR 21 mg, 21 mg, Transdermal, Daily-0600, 21 mg at 02/19/17 0600 **AND** Protocol 205 PATIENT EDUCATION MATERIALS, , , Once **AND** Protocol 205 Rotate nicotine patch application sites daily , , , CONTINUOUS **AND** nicotine polacrilex (NICORETTE) 2 MG piece 2 mg, 2 mg, Oral, Q HOUR PRN, Ana Moore M.D.    Current Outpatient Medications:  Prescriptions prior to admission   Medication Sig Dispense Refill Last Dose   • aspirin (ASA) 325 MG Tab Take 325 mg by mouth every evening.   2/18/2017 at pm   • lisinopril (PRINIVIL) 20 MG Tab Take one pill daily 90 Tab 3 2/19/2017 at am   • aspirin (ASA) 81 MG Chew Tab chewable tablet Take 81 mg by mouth every day.   2/19/2017 at am       Medication Allergy:  Allergies   Allergen Reactions   • Augmentin    • Codeine    • Eggs Nausea   • Lactose Nausea   • Other Food Nausea     Mayonnaise    • Pcn [Penicillins]   "      Family History:  History reviewed. No pertinent family history.    Social History:  Social History     Social History   • Marital Status:      Spouse Name: N/A   • Number of Children: N/A   • Years of Education: N/A     Occupational History   • Not on file.     Social History Main Topics   • Smoking status: Current Every Day Smoker -- 0.50 packs/day     Types: Cigarettes   • Smokeless tobacco: Never Used      Comment: 1 ppd   • Alcohol Use: No   • Drug Use: No   • Sexual Activity: Not on file     Other Topics Concern   • Not on file     Social History Narrative         Physical Exam:  Vitals  Weight/BMI: Body mass index is 28.63 kg/(m^2).  Blood pressure 135/75, pulse 90, temperature 36.3 °C (97.3 °F), resp. rate 19, height 1.727 m (5' 8\"), weight 85.4 kg (188 lb 4.4 oz), last menstrual period 02/21/2011, SpO2 97 %, not currently breastfeeding.  Filed Vitals:    02/19/17 0200 02/19/17 0400 02/19/17 0800 02/19/17 0825   BP: 126/59 125/66 175/73 135/75   Pulse: 72 73 90    Temp: 36.7 °C (98 °F) 36.7 °C (98 °F) 36.3 °C (97.3 °F)    Resp: 18 18 19    Height:       Weight: 85.4 kg (188 lb 4.4 oz)      SpO2: 96% 95% 97%      Oxygen Therapy:  Pulse Oximetry: 97 %, O2 (LPM): 0, O2 Delivery: None (Room Air)  General Appearance:   Well developed, Well nourished, No acute distress, Non-toxic appearance.   HENT:  Normocephalic, Atraumatic, Oropharynx moist mucous membranes, Dentition: , Nose normal.  Radha facial feels  Eyes:  PERRLA, EOMI, Conjunctiva normal, No discharge.  Neck:  Normal range of motion, No cervical tenderness, Supple, No stridor, no JVD .  No thyromegaly.  No carotid bruit.  Cardiovascular:  Normal heart rate, Normal rhythm,  S1, S2,  No S3, S4; No gallops; No murmurs, No rubs, .   Extremitites with intact distal pulses, no cyanosis, clubbing or edema.  No heaves, thrills, HJR;  Peripheral pulses: carotid 2+, brachial 2+, radial 2+, ulnar 2+, femoral 2+, popliteal 2+, PT 2+, DP 2+;  Lungs:  " Respiratory effort is normal. Normal breath sounds, breath sounds clear to auscultation bilaterally,  no rales, no rhonchi, no wheezing.   Abdomen: Bowel sounds normal, Soft, No tenderness, No guarding, No rebound, No masses, No hepatosplenomegaly.  Skin: Warm, Dry, No erythema, No rash, no induration or crepitus.  Neurologic: Alert & oriented x 3, Normal motor function, Normal sensory function, No focal deficits noted, cranial nerves II through XII are normal,    Psychiatric: Affect normal, Judgment normal, Mood normal.      MDM (Data Review):     Records reviewed and summarized in current documentation    Lab Data Review:  Recent Results (from the past 24 hour(s))   EKG (ER)    Collection Time: 17  7:51 PM   Result Value Ref Range    Report       [facility] Emergency Dept.    Test Date:  2017  Pt Name:    ADAMARIS FREEDMAN               Department: ER  MRN:        [id number]                      Room:  Gender:     F                            Technician: [id number]  :        1965                   Requested By:ER TRIAGE PROTOCOL  Order #:    [id number]                    Reading MD:    Measurements  Intervals                                Axis  Rate:       102                          P:          16  OR:         164                          QRS:        44  QRSD:       72                           T:          52  QT:         344  QTc:        449    Interpretive Statements  SINUS TACHYCARDIA  BORDERLINE T ABNORMALITIES, LATERAL LEADS  Compared to ECG 2016 21:20:47  T-wave abnormality now present  Sinus rhythm no longer present     TROPONIN    Collection Time: 17  8:37 PM   Result Value Ref Range    Troponin I <0.01 0.00 - 0.04 ng/mL   BTYPE NATRIURETIC PEPTIDE    Collection Time: 17  8:37 PM   Result Value Ref Range    B Natriuretic Peptide 3 0 - 100 pg/mL   CBC WITH DIFFERENTIAL    Collection Time: 17  8:37 PM   Result Value Ref Range    WBC 8.4 4.8 - 10.8 K/uL     RBC 4.29 4.20 - 5.40 M/uL    Hemoglobin 13.5 12.0 - 16.0 g/dL    Hematocrit 39.4 37.0 - 47.0 %    MCV 91.8 81.4 - 97.8 fL    MCH 31.5 27.0 - 33.0 pg    MCHC 34.3 33.6 - 35.0 g/dL    RDW 46.6 35.9 - 50.0 fL    Platelet Count 309 164 - 446 K/uL    MPV 9.8 9.0 - 12.9 fL    Neutrophils-Polys 60.40 44.00 - 72.00 %    Lymphocytes 26.80 22.00 - 41.00 %    Monocytes 9.30 0.00 - 13.40 %    Eosinophils 2.40 0.00 - 6.90 %    Basophils 0.70 0.00 - 1.80 %    Immature Granulocytes 0.40 0.00 - 0.90 %    Nucleated RBC 0.00 /100 WBC    Neutrophils (Absolute) 5.09 2.00 - 7.15 K/uL    Lymphs (Absolute) 2.25 1.00 - 4.80 K/uL    Monos (Absolute) 0.78 0.00 - 0.85 K/uL    Eos (Absolute) 0.20 0.00 - 0.51 K/uL    Baso (Absolute) 0.06 0.00 - 0.12 K/uL    Immature Granulocytes (abs) 0.03 0.00 - 0.11 K/uL    NRBC (Absolute) 0.00 K/uL   COMP METABOLIC PANEL    Collection Time: 02/18/17  8:37 PM   Result Value Ref Range    Sodium 137 135 - 145 mmol/L    Potassium 3.8 3.6 - 5.5 mmol/L    Chloride 108 96 - 112 mmol/L    Co2 20 20 - 33 mmol/L    Anion Gap 9.0 0.0 - 11.9    Glucose 93 65 - 99 mg/dL    Bun 14 8 - 22 mg/dL    Creatinine 1.04 0.50 - 1.40 mg/dL    Calcium 9.6 8.5 - 10.5 mg/dL    AST(SGOT) 16 12 - 45 U/L    ALT(SGPT) 11 2 - 50 U/L    Alkaline Phosphatase 95 30 - 99 U/L    Total Bilirubin 0.3 0.1 - 1.5 mg/dL    Albumin 4.1 3.2 - 4.9 g/dL    Total Protein 7.0 6.0 - 8.2 g/dL    Globulin 2.9 1.9 - 3.5 g/dL    A-G Ratio 1.4 g/dL   PROTHROMBIN TIME    Collection Time: 02/18/17  8:37 PM   Result Value Ref Range    PT 13.2 12.0 - 14.6 sec    INR 0.97 0.87 - 1.13   APTT    Collection Time: 02/18/17  8:37 PM   Result Value Ref Range    APTT 32.6 24.7 - 36.0 sec   LIPASE    Collection Time: 02/18/17  8:37 PM   Result Value Ref Range    Lipase 35 11 - 82 U/L   ESTIMATED GFR    Collection Time: 02/18/17  8:37 PM   Result Value Ref Range    GFR If African American >60 >60 mL/min/1.73 m 2    GFR If Non African American 55 (A) >60 mL/min/1.73 m 2    APTT    Collection Time: 17  2:36 AM   Result Value Ref Range    APTT 32.8 24.7 - 36.0 sec   Basic Metabolic Panel (BMP)    Collection Time: 17  2:36 AM   Result Value Ref Range    Sodium 139 135 - 145 mmol/L    Potassium 4.0 3.6 - 5.5 mmol/L    Chloride 110 96 - 112 mmol/L    Co2 22 20 - 33 mmol/L    Glucose 90 65 - 99 mg/dL    Bun 12 8 - 22 mg/dL    Creatinine 0.84 0.50 - 1.40 mg/dL    Calcium 8.8 8.5 - 10.5 mg/dL    Anion Gap 7.0 0.0 - 11.9   CBC without Differential    Collection Time: 17  2:36 AM   Result Value Ref Range    WBC 6.4 4.8 - 10.8 K/uL    RBC 4.01 (L) 4.20 - 5.40 M/uL    Hemoglobin 12.8 12.0 - 16.0 g/dL    Hematocrit 37.0 37.0 - 47.0 %    MCV 92.3 81.4 - 97.8 fL    MCH 31.9 27.0 - 33.0 pg    MCHC 34.6 33.6 - 35.0 g/dL    RDW 48.1 35.9 - 50.0 fL    Platelet Count 269 164 - 446 K/uL    MPV 9.5 9.0 - 12.9 fL   PROTHROMBIN TIME    Collection Time: 17  2:36 AM   Result Value Ref Range    PT 14.1 12.0 - 14.6 sec    INR 1.06 0.87 - 1.13   Troponin - Every four hours after STAT order X 2    Collection Time: 17  2:36 AM   Result Value Ref Range    Troponin I <0.01 0.00 - 0.04 ng/mL   LIPID PROFILE    Collection Time: 17  2:36 AM   Result Value Ref Range    Cholesterol,Tot 190 100 - 199 mg/dL    Triglycerides 134 0 - 149 mg/dL    HDL 30 (A) >=40 mg/dL     (H) <100 mg/dL   ESTIMATED GFR    Collection Time: 17  2:36 AM   Result Value Ref Range    GFR If African American >60 >60 mL/min/1.73 m 2    GFR If Non African American >60 >60 mL/min/1.73 m 2   EKG in 4 Hours    Collection Time: 17  5:46 AM   Result Value Ref Range    Report       [facility]    Test Date:  2017  Pt Name:    ADAMARIS FREEDMAN               Department: ER  MRN:        [id number]                      Room:       T707  Gender:     F                            Technician: CAMPBELL  :        1965                   Requested By:FRANSICO WILHELM  Order #:    [id number]                     Reading MD:    Measurements  Intervals                                Axis  Rate:       76                           P:          17  FL:         172                          QRS:        56  QRSD:       76                           T:          75  QT:         408  QTc:        459    Interpretive Statements  SINUS RHYTHM  Compared to ECG 02/18/2017 19:51:05  Sinus tachycardia no longer present  T-wave abnormality no longer present     Troponin - Every four hours after STAT order X 2    Collection Time: 02/19/17  6:34 AM   Result Value Ref Range    Troponin I <0.01 0.00 - 0.04 ng/mL       Imaging/Procedures Review:    Chest Xray:  Reviewed    EKG:   As in HPI. See above    MDM (Assessment and Plan):     Active Hospital Problems    Diagnosis   • Chest pain [R07.9]     Priority: High   • Slurred speech [R47.81]   • Left facial numbness [R20.0]     Long discussion with her about possible cardiac w/u; Agreed to Echo, if worsening or suspect injury, will proceed to [facility]  Otherwise outpatient follow up and med txmt  Will follow  x

## 2017-02-19 NOTE — ED NOTES
Discussed admission process and transfer to the floor with pt, all questions answered, pt verbalized understanding, no distress noted. Pt ambulated to the restroom with steady gait.

## 2017-02-19 NOTE — ED NOTES
"Patient to ED triage with complaints of \"I think I had a stroke.\"  She state at Approx 1730 she experienced a 10 minute episode of RIGHT sided burning, numbness and weakness. She states most symptoms resolved within 10 minutes, but she still have numbness to right tongue.   PERRL, no facial droop or arm drift, no slurred speech. States she did have changes in vision but that has resolved.     She also complaints of LEFT side chest pain, pressure that radiates from mid chest into left ribs and now left arm. States she first noticed pain and pressure this am at approx 11 am. She states it got worse over the day. Nothing makes it better or worse, non tender. No SOB. Did have some associated nausea and clammy skin. Hx of CHF  "

## 2017-02-20 VITALS
WEIGHT: 187.17 LBS | HEIGHT: 68 IN | OXYGEN SATURATION: 98 % | BODY MASS INDEX: 28.37 KG/M2 | RESPIRATION RATE: 16 BRPM | TEMPERATURE: 98 F | DIASTOLIC BLOOD PRESSURE: 73 MMHG | HEART RATE: 73 BPM | SYSTOLIC BLOOD PRESSURE: 142 MMHG

## 2017-02-20 PROBLEM — I73.9 PERIPHERAL VASCULAR DISEASE (HCC): Status: ACTIVE | Noted: 2017-02-20

## 2017-02-20 LAB
LV EJECT FRACT  99904: 60
LV EJECT FRACT MOD 2C 99903: 65.65
LV EJECT FRACT MOD 4C 99902: 61.18
LV EJECT FRACT MOD BP 99901: 61.06

## 2017-02-20 PROCEDURE — 97162 PT EVAL MOD COMPLEX 30 MIN: CPT

## 2017-02-20 PROCEDURE — 99238 HOSP IP/OBS DSCHRG MGMT 30/<: CPT | Performed by: INTERNAL MEDICINE

## 2017-02-20 PROCEDURE — 700102 HCHG RX REV CODE 250 W/ 637 OVERRIDE(OP): Performed by: INTERNAL MEDICINE

## 2017-02-20 PROCEDURE — 93306 TTE W/DOPPLER COMPLETE: CPT | Mod: 26 | Performed by: INTERNAL MEDICINE

## 2017-02-20 PROCEDURE — G8980 MOBILITY D/C STATUS: HCPCS | Mod: CI

## 2017-02-20 PROCEDURE — 97165 OT EVAL LOW COMPLEX 30 MIN: CPT

## 2017-02-20 PROCEDURE — A9270 NON-COVERED ITEM OR SERVICE: HCPCS | Performed by: INTERNAL MEDICINE

## 2017-02-20 PROCEDURE — G8987 SELF CARE CURRENT STATUS: HCPCS | Mod: CI

## 2017-02-20 PROCEDURE — G8979 MOBILITY GOAL STATUS: HCPCS | Mod: CI

## 2017-02-20 PROCEDURE — 93306 TTE W/DOPPLER COMPLETE: CPT

## 2017-02-20 PROCEDURE — G8988 SELF CARE GOAL STATUS: HCPCS | Mod: CI

## 2017-02-20 PROCEDURE — 700111 HCHG RX REV CODE 636 W/ 250 OVERRIDE (IP): Performed by: INTERNAL MEDICINE

## 2017-02-20 PROCEDURE — G8989 SELF CARE D/C STATUS: HCPCS | Mod: CI

## 2017-02-20 PROCEDURE — G8978 MOBILITY CURRENT STATUS: HCPCS | Mod: CI

## 2017-02-20 RX ADMIN — LISINOPRIL 20 MG: 20 TABLET ORAL at 09:33

## 2017-02-20 RX ADMIN — ENOXAPARIN SODIUM 40 MG: 100 INJECTION SUBCUTANEOUS at 09:32

## 2017-02-20 RX ADMIN — ASPIRIN 81 MG: 81 TABLET, CHEWABLE ORAL at 09:32

## 2017-02-20 RX ADMIN — CLOPIDOGREL 75 MG: 75 TABLET, FILM COATED ORAL at 09:32

## 2017-02-20 ASSESSMENT — ENCOUNTER SYMPTOMS
FEVER: 0
MYALGIAS: 0
HEADACHES: 0
DIZZINESS: 0
COUGH: 0
DIAPHORESIS: 0
FOCAL WEAKNESS: 0
NERVOUS/ANXIOUS: 1
CHILLS: 0
TREMORS: 0
SHORTNESS OF BREATH: 0

## 2017-02-20 ASSESSMENT — GAIT ASSESSMENTS
DISTANCE (FEET): 20
DEVIATION: BRADYKINETIC;INCREASED BASE OF SUPPORT;DECREASED HEEL STRIKE
GAIT LEVEL OF ASSIST: SUPERVISED

## 2017-02-20 ASSESSMENT — ACTIVITIES OF DAILY LIVING (ADL): TOILETING: INDEPENDENT

## 2017-02-20 ASSESSMENT — PAIN SCALES - GENERAL
PAINLEVEL_OUTOF10: 0

## 2017-02-20 NOTE — THERAPY
"Occupational Therapy Evaluation completed.   Functional Status: Pt is supervised supine to sit, supervised sit to stand, supervised mobility to/from bathroom without AD with one loss of balance that pt was able to self correct without contact from therapist, supervised to don socks, supervised toilet xfer, supervised standing grooming at sink.  Pt reports she has 24 hour family assist and all needed AE.  No further acute OT needs.   Plan of Care: 1 x only eval.   Discharge Recommendations:  Equipment: No Equipment Needed. Post-acute therapy: No needs.     See \"Rehab Therapy-Acute\" Patient Summary Report for complete documentation.    "

## 2017-02-20 NOTE — THERAPY
"Physical Therapy Evaluation completed.   Bed Mobility:  Supine to Sit: Supervised  Transfers: Sit to Stand: Supervised (from EOB and toilet)  Gait: Level Of Assist: Supervised with No Equipment Needed, 1 loss of balance requiring CGA for recovery       Plan of Care: Patient with no further skilled PT needs in the acute care setting at this time  Discharge Recommendations: Equipment: No Equipment Needed (please refer to OT documentation for any further needs).     Pt is a 52 y/o female s/p complaints of chest pain associated with L facial numbness and slurred speech which have all resolved. Pt with 1 LOB during ambulation requiring CGA for balance recovery, though pt supervised with all other functional mobility. Pt appears resistant to therapy session and continues to state \"I do not need anything.\" Pt with no further skilled acute PT needs at this time. Recommend d/c home when medically stable.    See \"Rehab Therapy-Acute\" Patient Summary Report for complete documentation.     "

## 2017-02-20 NOTE — DISCHARGE PLANNING
Care Transition Team Discharge Planning              Discharge Plan:  Pt will likely d/c home today after ECHO.

## 2017-02-20 NOTE — PROGRESS NOTES
Hospital Medicine Progress Note, Adult, Complex               Author: Penny Zavala Date & Time created: 2/19/2017  4:18 PM     Interval History:  The patient has known mild reduced ejection fraction on echocardiogram and presented with chest pain and bilateral arm weakness and speech difficulty which have all resolved    Today she is tearful stating she is concerned about a brain tumor as her mother had a brain tumor and passed away    Review of Systems:  Review of Systems   Constitutional: Negative for fever, chills and diaphoresis.   Respiratory: Negative for cough and shortness of breath.    Cardiovascular: Negative for chest pain and leg swelling.   Genitourinary: Negative for dysuria and urgency.   Musculoskeletal: Negative for myalgias.   Skin: Negative for rash.   Neurological: Negative for dizziness, tremors, focal weakness and headaches.   Psychiatric/Behavioral: The patient is nervous/anxious.        Physical Exam:  Physical Exam   Constitutional: No distress.   HENT:   Patient has had multiple teeth extractions   Eyes: EOM are normal.   Neck: Neck supple.   Cardiovascular: Normal rate, regular rhythm and intact distal pulses.    No murmur heard.  Pulmonary/Chest: Effort normal and breath sounds normal.   Abdominal: Soft. Bowel sounds are normal.   Neurological: She is alert.   Skin: Skin is warm and dry.   Nursing note and vitals reviewed.      Labs:        Invalid input(s): [id number]  Recent Labs      02/18/17 2037 02/19/17 0236 02/19/17   0634   TROPONINI  <0.01  <0.01  <0.01   BNPBTYPENAT  3   --    --      Recent Labs      02/18/17 2037 02/19/17   0236   SODIUM  137  139   POTASSIUM  3.8  4.0   CHLORIDE  108  110   CO2  20  22   BUN  14  12   CREATININE  1.04  0.84   CALCIUM  9.6  8.8     Recent Labs      02/18/17 2037 02/19/17   0236   ALTSGPT  11   --    ASTSGOT  16   --    ALKPHOSPHAT  95   --    TBILIRUBIN  0.3   --    LIPASE  35   --    GLUCOSE  93  90     Recent Labs       17   0236   RBC  4.29  4.01*   HEMOGLOBIN  13.5  12.8   HEMATOCRIT  39.4  37.0   PLATELETCT  309  269   PROTHROMBTM  13.2  14.1   APTT  32.6  32.8   INR  0.97  1.06     Recent Labs      17   0236   WBC  8.4  6.4   NEUTSPOLYS  60.40   --    LYMPHOCYTES  26.80   --    MONOCYTES  9.30   --    EOSINOPHILS  2.40   --    BASOPHILS  0.70   --    ASTSGOT  16   --    ALTSGPT  11   --    ALKPHOSPHAT  95   --    TBILIRUBIN  0.3   --            Hemodynamics:  Temp (24hrs), Av.5 °C (97.7 °F), Min:36.3 °C (97.3 °F), Max:36.8 °C (98.2 °F)  Temperature: 36.3 °C (97.3 °F)  Pulse  Av.9  Min: 72  Max: 105Heart Rate (Monitored): 79  Blood Pressure: 135/58 mmHg, NIBP: 117/56 mmHg     Respiratory:    Respiration: 19, Pulse Oximetry: 97 %        RUL Breath Sounds: Clear, RML Breath Sounds: Clear, RLL Breath Sounds: Diminished, LISA Breath Sounds: Clear, LLL Breath Sounds: Diminished  Fluids:  No intake or output data in the 24 hours ending 17 1618  Weight: 85.4 kg (188 lb 4.4 oz)  GI/Nutrition:  Orders Placed This Encounter   Procedures   • Diet NPO     Standing Status: Standing      Number of Occurrences: 1      Standing Expiration Date:      Order Specific Question:  Restrict to:     Answer:  Strict [1]     Medical Decision Making, by Problem:  Active Hospital Problems    Diagnosis   • Chest pain [R07.9] cardiology consulted, discussed with Dr. Verdugo and will repeat an echocardiogram and stress test only if her echocardiogram is changed   • Slurred speech [R47.81] resolved, MRI with old trauma and scar tissue, results discussed with patient and her  at the bedside   • Left facial numbness [R20.0] resolved  Peripheral vascular disease, will need follow up after discharge, will start statin therapy  Tobacco dependence, patient was educated to discontinue use       Labs reviewed, Medications reviewed and Radiology images reviewed  Mcnamara catheter: No Mcnamara      DVT  Prophylaxis: Enoxaparin (Lovenox)    Ulcer prophylaxis: Not indicated    Assessed for rehab: Patient returned to prior level of function, rehabilitation not indicated at this time

## 2017-02-20 NOTE — PROGRESS NOTES
Mónica Aguilera patient has chosen to leave the hospital against medical advice. The attending physician has not discharged the patient. Patient is not a risk to himself or others. I have discussed with the patient the following:  Physician has not determined patient is ready for discharge, Risks and consequences of leaving the hospital too soon and Benefit of continued hospitalization.      Discharge against medical advice form has been Signed.      Attending physician has been notified.

## 2017-02-20 NOTE — CARE PLAN
Problem: Pain Management  Goal: Pain level will decrease to patient’s comfort goal  Pain assessed Q2h. Pain medication given per orders, see MAR.

## 2017-02-20 NOTE — PROGRESS NOTES
Bed side report given. Assumed pt care.  Pt does not appear to be in distress. Bed in low position and call light with in reach. Pt is alert and oriented x 4.  Denies shortness of breath or pain.  Will continue to monitor.

## 2017-02-20 NOTE — RESPIRATORY CARE
COPD EDUCATION by COPD CLINICAL EDUCATOR  2/20/2017 at 6:49 AM by Nika Claudio     Patient reviewed by COPD education team. Patient does not qualify for COPD program.

## 2017-02-20 NOTE — PROGRESS NOTES
Pt agreed to wear tele if iv comes out.    md ok with this orders placed.  Will continue to monitor.  Pt refusing bed alarm.  Is steady on feet to bathroom independent.

## 2017-02-20 NOTE — PROGRESS NOTES
Rounding on pt.  Before RN walked into room, she could hear pt and SO arguing then SO left room.  Pt is A&Ox4, noncompliant irritable.  Refuses Lipitor and stool softeners despite rationale.  VSS.  Denies pain.  Up self.  Calls appropriately.  Denies N/T. Pt updated on POC, needs met and questions answered. Telemonitor in place. Call light within reach.

## 2017-02-20 NOTE — PROGRESS NOTES
"Pt states \"I am walking out of here at noon.\" Educated pt and updated pt with plan of care.  Still wants to leave.  MD aware.   "

## 2017-02-21 NOTE — DISCHARGE SUMMARY
DATE OF ADMISSION:  02/18/2017    DATE OF DISCHARGE:  02/20/2017    DISCHARGE DIAGNOSES:  1.  Acute chest pain episode.  2.  Acute speech difficulty with bilateral arm weakness that did resolve   completely.  3.  Diffuse peripheral vascular disease.  4.  Tobacco dependence.    HOSPITAL COURSE:  The patient is a 51-year-old female who presented to the   hospital with an episode of chest pain, some speech slurring, and bilateral   arm weakness.  She did present to the hospital and was admitted to the   hospital for further workup.  CT scan of the head was abnormal showing some   calcifications.  CT angiogram of the neck showed no significant stenosis and   CT angiogram of the thoracic aorta did reveal diffuse atherosclerotic plaque.    Patient had no evidence of abdominal pain and no lower extremity pain to   suggest decreased flow to her tissues.  She was monitored on telemetry and   serial troponin enzymes were in the normal range at less than 0.01.  Patient's   previous echocardiogram in January 2016 did show reduced ejection fraction of   40%.  I did consult cardiology, Dr. Verdugo, he did see the patient and   recommended repeat echocardiogram and this had improved.  He felt no further   cardiac workup would be necessary.  A repeat echocardiogram was done with   ejection fraction improved at 60%.  For her peripheral vascular disease, I did   discuss her case with Dr. James from vascular surgery who recommended   outpatient followup in his office.    DISPOSITION:  Patient is discharged to home.    DISCHARGE MEDICATIONS:  Aspirin 325 mg daily, lisinopril 20 mg daily.      DISCHARGE FOLLOWUP:  With Dr. James, she is to make an appointment within   the next 2-3 months; with cardiology, Dr. Verdugo as needed; and with her   primary care provider, Haley Noriega as needed.       ____________________________________     MD DONNA LEVINE / [facility]    DD:  02/20/2017 17:26:56  DT:  02/20/2017 18:38:41    D#:  [id number]   Job#:  [id number]    cc: Akhil James MD, GISSELL FRAGA, Evelyne Verdugo MD, PhD, FACC

## 2017-11-13 ENCOUNTER — TELEPHONE (OUTPATIENT)
Dept: HEALTH INFORMATION MANAGEMENT | Facility: OTHER | Age: 52
End: 2017-11-13

## 2017-11-13 DIAGNOSIS — I50.21 ACUTE SYSTOLIC CONGESTIVE HEART FAILURE (HCC): ICD-10-CM

## 2017-11-13 NOTE — TELEPHONE ENCOUNTER
Mónica has been identified as a patient who could benefit from the services of the Heart Failure Program with [facility] Cardiology department. Please review the pended referral order and sign if this is a service that you wish for Mónica to receive.

## 2017-11-14 NOTE — TELEPHONE ENCOUNTER
Patient has not been seen for over one year.  Reaching out to her for her heart care is a good plan. I will send the referral.   Haley Noriega